# Patient Record
Sex: FEMALE | Race: WHITE | NOT HISPANIC OR LATINO
[De-identification: names, ages, dates, MRNs, and addresses within clinical notes are randomized per-mention and may not be internally consistent; named-entity substitution may affect disease eponyms.]

---

## 2023-03-09 PROBLEM — Z00.00 ENCOUNTER FOR PREVENTIVE HEALTH EXAMINATION: Status: ACTIVE | Noted: 2023-03-09

## 2023-03-14 ENCOUNTER — APPOINTMENT (OUTPATIENT)
Dept: CARDIOLOGY | Facility: CLINIC | Age: 88
End: 2023-03-14
Payer: MEDICARE

## 2023-03-14 ENCOUNTER — LABORATORY RESULT (OUTPATIENT)
Age: 88
End: 2023-03-14

## 2023-03-14 ENCOUNTER — NON-APPOINTMENT (OUTPATIENT)
Age: 88
End: 2023-03-14

## 2023-03-14 VITALS
BODY MASS INDEX: 20.01 KG/M2 | RESPIRATION RATE: 16 BRPM | HEART RATE: 52 BPM | TEMPERATURE: 98 F | HEIGHT: 61 IN | DIASTOLIC BLOOD PRESSURE: 70 MMHG | WEIGHT: 106 LBS | SYSTOLIC BLOOD PRESSURE: 160 MMHG | OXYGEN SATURATION: 93 %

## 2023-03-14 DIAGNOSIS — I51.7 CARDIOMEGALY: ICD-10-CM

## 2023-03-14 PROCEDURE — 93000 ELECTROCARDIOGRAM COMPLETE: CPT

## 2023-03-14 PROCEDURE — 99203 OFFICE O/P NEW LOW 30 MIN: CPT | Mod: 25

## 2023-03-14 NOTE — DISCUSSION/SUMMARY
[FreeTextEntry1] : This is a 89-year-old female with past medical history significant for hypertension, status post hysterectomy, status post appendectomy, scoliosis, status post repair of rectocele, who comes in for cardiac consultation.\par She denies chest pain, shortness of breath, dizziness or syncope.  She has no history of rheumatic fever.  She does not drink excessive caffeine or alcohol.\par Cardiac risk factors include hypertension and history of smoking 1 pack/week for approximately 37 years.\par The patient is taking atenolol daily (she is unsure of the dose) and will call us with the dosage, amlodipine 2.5 mg/day, aspirin 81 mg/day and over-the-counter fish oil.\par Electrocardiogram done March 14, 2023 demonstrated sinus bradycardia rate of 52 bpm is otherwise remarkable for left atrial abnormality, right bundle branch block, and first-degree atrioventricular block.  There are T wave inversion in lead III, aVF, and V1 to V4.\par Echo Doppler examination done March 16, 2021 demonstrated mild aortic valve regurgitation, mild mitral valve regurgitation, moderate tricuspid valve regurgitation, normal left ventricular function with estimated ejection fraction of 60%, left atrial enlargement.\par Her blood pressure is elevated today, and she will increase her amlodipine to 5 mg daily.\par She will new blood work done today for electrolytes, lipid panel SMA-20.\par She is instructed to follow-up with her primary care physician.  She will follow-up with me in 6 to 8 weeks to reevaluate her blood pressure.\par She is currently hemodynamically stable and asymptomatic from a cardiac standpoint.\par \par Pt is a 89 year old female coming in for cardiac consultation. She has a PMH of HTN, scoliosis, s/p hysterectomy, s/p appendectomy, s/p hernia repair, s/p rectocele. She is overall doing well today and denies chest pain, dizziness, palpations, SOB, or leg swelling. She does admit to back pain. She is currently on Atenolol 50mg (in the morning), Amlodipine 2.5mg (at night), Aspirin 81mg, and fish oil. She has allergies to penicillin and has a smoking history of 4 cigs/day for 37 years but stopped 37 years ago. Her mother had history of HTN and angina. Her EKG today shows sinus bradycardia with rate 52 with RBBB with left-axis bifascicular block and left atrial enlargement. \par \par Her transthoracic echo on 3/16/21 showed severe left atrial dilatation (LA volume index 57 ml/m^2), LV EF 60%, right atrium dilational (RA volume index 43 ml/m^2), mild AR, mild MR, moderate TR.

## 2023-03-14 NOTE — REASON FOR VISIT
[CV Risk Factors and Non-Cardiac Disease] : CV risk factors and non-cardiac disease [Arrhythmia/ECG Abnorrmalities] : arrhythmia/ECG abnormalities [Hypertension] : hypertension [FreeTextEntry3] : Dr. Dominguez [FreeTextEntry1] : Pt is a 89 year old female coming in for cardiac consultation. She has a PMH of HTN, scoliosis, s/p hysterectomy, s/p appendectomy, s/p hernia repair, s/p rectocele. She is overall doing well today and denies chest pain, dizziness, palpations, SOB, or leg swelling. She does admit to back pain. She is currently on Atenolol 50mg (in the morning), Amlodipine 2.5mg (at night), Aspirin 81mg, and fish oil. She has allergies to penicillin and has a smoking history of 4 cigs/day for 37 years but stopped 37 years ago. Her mother had history of HTN and angina. Her EKG today shows sinus bradycardia with rate 52 with RBBB with left-axis bifascicular block and left atrial enlargement. \par \par Her transthoracic echo on 3/16/21 showed severe left atrial dilatation (LA volume index 57 ml/m^2), LV EF 60%, right atrium dilational (RA volume index 43 ml/m^2), mild AR, mild MR, moderate TR.\par

## 2023-10-06 ENCOUNTER — RX RENEWAL (OUTPATIENT)
Age: 88
End: 2023-10-06

## 2023-10-08 DIAGNOSIS — Z86.79 PERSONAL HISTORY OF OTHER DISEASES OF THE CIRCULATORY SYSTEM: ICD-10-CM

## 2023-10-09 ENCOUNTER — APPOINTMENT (OUTPATIENT)
Dept: CARDIOLOGY | Facility: CLINIC | Age: 88
End: 2023-10-09

## 2023-10-22 ENCOUNTER — NON-APPOINTMENT (OUTPATIENT)
Age: 88
End: 2023-10-22

## 2023-10-23 ENCOUNTER — EMERGENCY (EMERGENCY)
Facility: HOSPITAL | Age: 88
LOS: 1 days | Discharge: HOME | End: 2023-10-23
Attending: EMERGENCY MEDICINE | Admitting: EMERGENCY MEDICINE
Payer: MEDICARE

## 2023-10-23 VITALS
WEIGHT: 110.01 LBS | HEIGHT: 65 IN | HEART RATE: 62 BPM | RESPIRATION RATE: 17 BRPM | SYSTOLIC BLOOD PRESSURE: 154 MMHG | OXYGEN SATURATION: 98 % | TEMPERATURE: 98 F | DIASTOLIC BLOOD PRESSURE: 74 MMHG

## 2023-10-23 VITALS
RESPIRATION RATE: 15 BRPM | HEART RATE: 65 BPM | TEMPERATURE: 98 F | SYSTOLIC BLOOD PRESSURE: 148 MMHG | OXYGEN SATURATION: 100 % | DIASTOLIC BLOOD PRESSURE: 76 MMHG

## 2023-10-23 PROCEDURE — 90715 TDAP VACCINE 7 YRS/> IM: CPT

## 2023-10-23 PROCEDURE — 72125 CT NECK SPINE W/O DYE: CPT | Mod: 26,MA

## 2023-10-23 PROCEDURE — 72125 CT NECK SPINE W/O DYE: CPT | Mod: MA

## 2023-10-23 PROCEDURE — 70486 CT MAXILLOFACIAL W/O DYE: CPT | Mod: 26,MA

## 2023-10-23 PROCEDURE — 70450 CT HEAD/BRAIN W/O DYE: CPT | Mod: MA

## 2023-10-23 PROCEDURE — 99284 EMERGENCY DEPT VISIT MOD MDM: CPT

## 2023-10-23 PROCEDURE — 99284 EMERGENCY DEPT VISIT MOD MDM: CPT | Mod: 25

## 2023-10-23 PROCEDURE — 90471 IMMUNIZATION ADMIN: CPT

## 2023-10-23 PROCEDURE — 70486 CT MAXILLOFACIAL W/O DYE: CPT | Mod: MA

## 2023-10-23 PROCEDURE — 70450 CT HEAD/BRAIN W/O DYE: CPT | Mod: 26,MA

## 2023-10-23 RX ORDER — TETANUS TOXOID, REDUCED DIPHTHERIA TOXOID AND ACELLULAR PERTUSSIS VACCINE, ADSORBED 5; 2.5; 8; 8; 2.5 [IU]/.5ML; [IU]/.5ML; UG/.5ML; UG/.5ML; UG/.5ML
0.5 SUSPENSION INTRAMUSCULAR ONCE
Refills: 0 | Status: COMPLETED | OUTPATIENT
Start: 2023-10-23 | End: 2023-10-23

## 2023-10-23 RX ORDER — ACETAMINOPHEN 500 MG
650 TABLET ORAL ONCE
Refills: 0 | Status: DISCONTINUED | OUTPATIENT
Start: 2023-10-23 | End: 2023-10-27

## 2023-10-23 RX ADMIN — TETANUS TOXOID, REDUCED DIPHTHERIA TOXOID AND ACELLULAR PERTUSSIS VACCINE, ADSORBED 0.5 MILLILITER(S): 5; 2.5; 8; 8; 2.5 SUSPENSION INTRAMUSCULAR at 14:59

## 2023-10-23 NOTE — ED ADULT NURSE NOTE - NSFALLHARMRISKINTERV_ED_ALL_ED

## 2023-10-23 NOTE — ED PROVIDER NOTE - PHYSICAL EXAMINATION
On exam patient sitting up in bed no acute distress bilateral ecchymosis noted to the superior and inferior orbital regions with no bony deformity.  Pupils equal round and reactive extraocular muscles are intact without pain on range of motion.  There is contusion noted to the nasal bridge no active epistaxis or septal hematoma noted.  Full range of motion of the jaw with mild clicking no loose teeth noted.  No midline C-T-L tenderness to palpation  Full range of motion of bilateral upper and lower extremities with no pain on range of motion neurovascular intact  No chest wall tenderness to palpation  No pelvic tenderness to palpation  There is a small abrasion noted to the left lateral nasal bridge with no active bleeding at this time

## 2023-10-23 NOTE — ED PROVIDER NOTE - DIFFERENTIAL DIAGNOSIS
Patient presenting to the emergency room status post mechanical fall day prior.  Neurovascular intact at this time ambulated in.  Patient noted to have orbital trauma.  No visual changes.  Will obtain CT imaging of the head to exclude intracranial hemorrhage CT maxillofacial to exclude orbital nasal fracture CT imaging of the cervical spine and will monitor.  Refusing pain medication at this time Differential Diagnosis

## 2023-10-23 NOTE — ED PROVIDER NOTE - PATIENT PORTAL LINK FT
You can access the FollowMyHealth Patient Portal offered by [facility] by registering at the following website: [email or web address]. By joining [facility]’s FollowMyHealth portal, you will also be able to view your health information using other applications (apps) compatible with our system.

## 2023-10-23 NOTE — ED ADULT TRIAGE NOTE - CHIEF COMPLAINT QUOTE
Patient BIB son with c/o mechanical fall up one step yesterday around 5pm with positive head strike, -LOC. Patient with ecchymosis to B/L periorbital area and forehead. Patient admits to taking one baby ASA daily. Patient denies N/V/ dizziness, visual changes, endorses HA but states this feels like her normal headache.

## 2023-10-23 NOTE — ED PROVIDER NOTE - OBJECTIVE STATEMENT
Patient is a 89-year-old female who presents to the emergency room with a chief complaint of facial pain status post mechanical fall.  Past medical history of hypertension on Norvasc chronic neck and back pain on naproxen as needed but has not taken this in quite some time.  Patient does endorse she is on a daily aspirin.  She has an unsteady gait at baseline and when out she uses a walker as an assist device.  Yesterday she was walking up the steps tripped on 1 step and fell forward striking her face.  At the time she was wearing glasses and sustained bruising to her orbital region and nose.  Denies loss of consciousness was able to get up on her own and has been ambulatory since with no difficulty.  Currently denies any head pain visual changes nausea vomiting chest pain shortness of breath or abdominal pain.  No neck or back pain at this time.  No extremity numbness or weakness.  No loss of bowel or bladder control.  Ambulatory without issue.  She does note that this morning when she blew her nose there was some mild blood which prompted her visit to the emergency room no active epistaxis at this time.

## 2023-10-23 NOTE — ED ADULT NURSE NOTE - OBJECTIVE STATEMENT
Pt received in bed alert and oriented and resting in bed with the c/o mechanical fall yesterday when going up the stairs. Pt hit head/face on stairs. Pt presented in ED B/L periorbital area and forehead bruising/ecchymosis. Pt denies any dizziness, or nausea and vomiting. As per Md's orders meds given and tolerated well.
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
## 2023-10-23 NOTE — ED PROVIDER NOTE - NSFOLLOWUPINSTRUCTIONS_ED_ALL_ED_FT
Return to the ED for any new or worsening symptoms  Take your medication as prescribed  Doxycycline 1 tab twice a day please complete the antibiotic prescription  Follow-up with the plastic surgeon as needed  Advance activity as tolerated        Head Injury    WHAT YOU NEED TO KNOW:    A head injury can include your scalp, face, skull, or brain and range from mild to severe. Effects can appear immediately after the injury or develop later. The effects may last a short time or be permanent. Healthcare providers may want to check your recovery over time. Treatment may change as you recover or develop new health problems from the head injury.    DISCHARGE INSTRUCTIONS:    Call your local emergency number (911 in the ), or have someone else call if:    You cannot be woken.    You have a seizure.    You stop responding to others or you faint.    You have blurry or double vision.    Your speech becomes slurred or confused.    You have arm or leg weakness, loss of feeling, or new problems with coordination.    Your pupils are larger than usual, or one pupil is a different size than the other.    You have blood or clear fluid coming out of your ears or nose.  Seek care immediately if:    You have repeated or forceful vomiting.    You feel confused.    Your headache gets worse or becomes severe.    You or someone caring for you notices that you are harder to wake than usual.  Call your doctor if:    Your symptoms last longer than 6 weeks after the injury.    You have questions or concerns about your condition or care.  Medicines:    Acetaminophen decreases pain and fever. It is available without a doctor's order. Ask how much to take and how often to take it. Follow directions. Read the labels of all other medicines you are using to see if they also contain acetaminophen, or ask your doctor or pharmacist. Acetaminophen can cause liver damage if not taken correctly.    Take your medicine as directed. Contact your healthcare provider if you think your medicine is not helping or if you have side effects. Tell your provider if you are allergic to any medicine. Keep a list of the medicines, vitamins, and herbs you take. Include the amounts, and when and why you take them. Bring the list or the pill bottles to follow-up visits. Carry your medicine list with you in case of an emergency.  Self-care:    Rest or do quiet activities. Limit your time watching TV, using the computer, or doing tasks that require a lot of thinking. Slowly return to your normal activities as directed. Do not play sports or do activities that may cause you to get hit in the head. Ask your healthcare provider when you can return to sports.    Apply ice on your head for 15 to 20 minutes every hour or as directed. Use an ice pack, or put crushed ice in a plastic bag. Cover it with a towel before you apply it to your skin. Ice helps prevent tissue damage and decreases swelling and pain.    Have someone stay with you for 24 hours , or as directed. This person can monitor you for problems and call for help if needed. When you are awake, the person should ask you a few questions every few hours to see if you are thinking clearly. An example is to ask your name or address.  Prevent another head injury:    Wear a helmet that fits properly. Do this when you play sports, or ride a bike, scooter, or skateboard. Helmets help decrease your risk for a serious head injury. Talk to your healthcare provider about other ways you can protect yourself if you play sports.    Wear your seatbelt every time you are in a car. This helps lower your risk for a head injury if you are in a car accident.  Follow up with your doctor as directed: Write down your questions so you remember to ask them during your visits.

## 2023-10-23 NOTE — ED PROVIDER NOTE - CARE PROVIDER_API CALL
Tanner Yusuf  Plastic Surgery  [address]  [place] [address]  Phone: [phone]  Fax: [phone]  Follow Up Time:

## 2024-03-28 ENCOUNTER — RX RENEWAL (OUTPATIENT)
Age: 89
End: 2024-03-28

## 2024-04-15 ENCOUNTER — APPOINTMENT (OUTPATIENT)
Dept: CARDIOLOGY | Facility: CLINIC | Age: 89
End: 2024-04-15
Payer: MEDICARE

## 2024-04-15 VITALS
OXYGEN SATURATION: 97 % | BODY MASS INDEX: 19.63 KG/M2 | SYSTOLIC BLOOD PRESSURE: 126 MMHG | WEIGHT: 104 LBS | TEMPERATURE: 98.1 F | HEART RATE: 66 BPM | HEIGHT: 61 IN | RESPIRATION RATE: 16 BRPM | DIASTOLIC BLOOD PRESSURE: 75 MMHG

## 2024-04-15 DIAGNOSIS — I07.1 RHEUMATIC TRICUSPID INSUFFICIENCY: ICD-10-CM

## 2024-04-15 DIAGNOSIS — Z86.79 PERSONAL HISTORY OF OTHER DISEASES OF THE CIRCULATORY SYSTEM: ICD-10-CM

## 2024-04-15 PROCEDURE — G2211 COMPLEX E/M VISIT ADD ON: CPT

## 2024-04-15 PROCEDURE — 99214 OFFICE O/P EST MOD 30 MIN: CPT

## 2024-04-15 PROCEDURE — 93000 ELECTROCARDIOGRAM COMPLETE: CPT

## 2024-04-15 RX ORDER — AMLODIPINE BESYLATE 5 MG/1
5 TABLET ORAL
Qty: 90 | Refills: 1 | Status: ACTIVE | COMMUNITY
Start: 2023-03-14 | End: 1900-01-01

## 2024-04-15 NOTE — DISCUSSION/SUMMARY
[FreeTextEntry1] : Dr. Osorio-(PRIOR VISIT and PMH WITH Dr. Osorio): This is a 89-year-old female with past medical history significant for hypertension, status post hysterectomy, status post appendectomy, scoliosis, status post repair of rectocele, who comes in for cardiac consultation. She denies chest pain, shortness of breath, dizziness or syncope.  She has no history of rheumatic fever.  She does not drink excessive caffeine or alcohol. Cardiac risk factors include hypertension and history of smoking 1 pack/week for approximately 37 years. The patient is taking atenolol daily (she is unsure of the dose) and will call us with the dosage, amlodipine 2.5 mg/day, aspirin 81 mg/day and over-the-counter fish oil. Electrocardiogram done March 14, 2023 demonstrated sinus bradycardia rate of 52 bpm is otherwise remarkable for left atrial abnormality, right bundle branch block, and first-degree atrioventricular block.  There are T wave inversion in lead III, aVF, and V1 to V4. Echo Doppler examination done March 16, 2021 demonstrated mild aortic valve regurgitation, mild mitral valve regurgitation, moderate tricuspid valve regurgitation, normal left ventricular function with estimated ejection fraction of 60%, left atrial enlargement. Her blood pressure is elevated today, and she will increase her amlodipine to 5 mg daily. She will new blood work done today for electrolytes, lipid panel SMA-20. She is instructed to follow-up with her primary care physician.  She will follow-up with me in 6 to 8 weeks to reevaluate her blood pressure. She is currently hemodynamically stable and asymptomatic from a cardiac standpoint.  Pt is a 89 year old female coming in for cardiac consultation. She has a PMH of HTN, scoliosis, s/p hysterectomy, s/p appendectomy, s/p hernia repair, s/p rectocele. She is overall doing well today and denies chest pain, dizziness, palpations, SOB, or leg swelling. She does admit to back pain. She is currently on Atenolol 50mg (in the morning), Amlodipine 2.5mg (at night), Aspirin 81mg, and fish oil. She has allergies to penicillin and has a smoking history of 4 cigs/day for 37 years but stopped 37 years ago. Her mother had history of HTN and angina. Her EKG today shows sinus bradycardia with rate 52 with RBBB with left-axis bifascicular block and left atrial enlargement.   Her transthoracic echo on 3/16/21 showed severe left atrial dilatation (LA volume index 57 ml/m^2), LV EF 60%, right atrium dilational (RA volume index 43 ml/m^2), mild AR, mild MR, moderate TR.

## 2024-04-15 NOTE — ASSESSMENT
[FreeTextEntry1] : This is a 89-year-old female with past medical history significant for hypertension, status post hysterectomy, status post appendectomy, scoliosis, status post repair of rectocele, who comes in for cardiac follow-up evaluation.  She has no history of rheumatic fever.  She does not drink excessive caffeine or alcohol.  Cardiac risk factors include hypertension and history of smoking 1 pack/week for approximately 37 years.   HPI: She is feeling generally well today and denies chest pain, dizziness, heart palpitations, recent episodes of syncope or falls, SOB, or dyspnea at this time.  Current Medications: Atenolol 25 mg daily, Amlodipine 5 mg daily, Aspirin 81 mg daily, and Fish Oil.    BLOOD PRESSURE: Controlled.   BLOOD WORK: -Lipid panel done by PCP April 2024 demonstrated cholesterol 148, triglycerides 96, HDL 41, LDL 88.  -Lipid panel done March 2023 demonstrated triglycerides 65, cholesterol 172, HDL 42, , Non-, LDL direct 118.    TESTING/REPORTS: -EKG done 04/15/2024 demonstrated sinus bradycardia rate 52 BPM otherwise remarkable for right bundle branch block, left atrial abnormality, and left axis deviation. There are T wave inversions in lead III, aVF, V1-V4.  -Electrocardiogram done March 14, 2023 demonstrated sinus bradycardia rate of 52 bpm is otherwise remarkable for left atrial abnormality, right bundle branch block, and first-degree atrioventricular block.  There are T wave inversion in lead III, aVF, and V1 to V4.   PLAN: -She will continue her current medications and contact the office if problems arise before next follow-up appointment. -Patient will schedule echocardiogram doppler examination to evaluate murmur, left ventricular function, chamber size, and rule out hypertrophy. -She will follow-up with her PCP routinely.    I have discussed the plan of care with CHRISTINE HAILE and she will follow up in 3 months. They are compliant with all of their medications.     The patient understands that aerobic exercises must be increased to 40 minutes 4 times/week and a detailed discussion of lifestyle modification was done today.   The patient has a good understanding of the diagnosis, treatment plan and lifestyle modification.   They will contact me at the office for any questions with their care or any changes in their health status.   The patient was discussed with supervision physician Dr. Dk Osorio at the time of the visit and the plan of care will be carried out as noted above.     SUMANTH Phelps NP

## 2024-05-20 ENCOUNTER — NON-APPOINTMENT (OUTPATIENT)
Age: 89
End: 2024-05-20

## 2024-06-17 ENCOUNTER — LABORATORY RESULT (OUTPATIENT)
Age: 89
End: 2024-06-17

## 2024-06-17 ENCOUNTER — NON-APPOINTMENT (OUTPATIENT)
Age: 89
End: 2024-06-17

## 2024-06-17 ENCOUNTER — APPOINTMENT (OUTPATIENT)
Dept: CARDIOLOGY | Facility: CLINIC | Age: 89
End: 2024-06-17
Payer: MEDICARE

## 2024-06-17 VITALS
HEIGHT: 61 IN | HEART RATE: 50 BPM | WEIGHT: 106 LBS | OXYGEN SATURATION: 97 % | RESPIRATION RATE: 16 BRPM | TEMPERATURE: 98 F | DIASTOLIC BLOOD PRESSURE: 93 MMHG | BODY MASS INDEX: 20.01 KG/M2 | SYSTOLIC BLOOD PRESSURE: 129 MMHG

## 2024-06-17 DIAGNOSIS — I45.10 UNSPECIFIED RIGHT BUNDLE-BRANCH BLOCK: ICD-10-CM

## 2024-06-17 DIAGNOSIS — I34.0 NONRHEUMATIC MITRAL (VALVE) INSUFFICIENCY: ICD-10-CM

## 2024-06-17 DIAGNOSIS — I10 ESSENTIAL (PRIMARY) HYPERTENSION: ICD-10-CM

## 2024-06-17 DIAGNOSIS — I35.1 NONRHEUMATIC AORTIC (VALVE) INSUFFICIENCY: ICD-10-CM

## 2024-06-17 DIAGNOSIS — I65.29 OCCLUSION AND STENOSIS OF UNSPECIFIED CAROTID ARTERY: ICD-10-CM

## 2024-06-17 DIAGNOSIS — I65.22 OCCLUSION AND STENOSIS OF LEFT CAROTID ARTERY: ICD-10-CM

## 2024-06-17 PROCEDURE — 99214 OFFICE O/P EST MOD 30 MIN: CPT

## 2024-06-17 PROCEDURE — G2211 COMPLEX E/M VISIT ADD ON: CPT

## 2024-06-17 PROCEDURE — 93000 ELECTROCARDIOGRAM COMPLETE: CPT

## 2024-06-17 RX ORDER — ROSUVASTATIN CALCIUM 5 MG/1
5 TABLET, FILM COATED ORAL DAILY
Qty: 90 | Refills: 1 | Status: ACTIVE | COMMUNITY
Start: 2024-06-17 | End: 1900-01-01

## 2024-06-17 NOTE — REASON FOR VISIT
[CV Risk Factors and Non-Cardiac Disease] : CV risk factors and non-cardiac disease [Arrhythmia/ECG Abnorrmalities] : arrhythmia/ECG abnormalities [Hypertension] : hypertension [FreeTextEntry3] : Dr. Dominguez [FreeTextEntry1] : Pt is a 89 year old female coming in for cardiac consultation. She has a PMH of HTN, scoliosis, s/p hysterectomy, s/p appendectomy, s/p hernia repair, s/p rectocele. She is overall doing well today and denies chest pain, dizziness, palpations, SOB, or leg swelling. She does admit to back pain. She is currently on Atenolol 50mg (in the morning), Amlodipine 2.5mg (at night), Aspirin 81mg, and fish oil. She has allergies to penicillin and has a smoking history of 4 cigs/day for 37 years but stopped 37 years ago. Her mother had history of HTN and angina. Her EKG today shows sinus bradycardia with rate 52 with RBBB with left-axis bifascicular block and left atrial enlargement.   Her transthoracic echo on 3/16/21 showed severe left atrial dilatation (LA volume index 57 ml/m^2), LV EF 60%, right atrium dilational (RA volume index 43 ml/m^2), mild AR, mild MR, moderate TR.

## 2024-06-17 NOTE — DISCUSSION/SUMMARY
[FreeTextEntry1] : This is a 89-year-old female with past medical history significant for hypertension, status post hysterectomy, status post appendectomy, scoliosis, status post repair of rectocele, who comes in for cardiac follow-up evaluation. She denies chest pain, shortness of breath, dizziness or syncope.  She has no history of rheumatic fever.  She does not drink excessive caffeine or alcohol. Cardiac risk factors include hypertension and history of smoking 1 pack/week for approximately 37 years. Electrocardiogram done June 17, 2024 demonstrates sinus bradycardia rate of 50 bpm is otherwise remarkable for right bundle branch block, left atrial abnormality, and nonspecific ST-T wave changes. The patient is taking atenolol daily (she is unsure of the dose) and will call us with the dosage, amlodipine 2.5 mg/day, aspirin 81 mg/day and over-the-counter fish oil. The patient was seen by her neurologist who ordered a carotid MRA May 2, 2024 which demonstrated moderate stenosis of the origin of the left internal carotid artery felt to be 50 to 69% which was more pronounced on this examination than the previous study there was otherwise normal flow in the right coronary artery was unremarkable for stenosis. I recommended she make an appoint with Dr. Melgar of vascular surgery for evaluation and surveillance. I recommend the patient's start on Crestor 5 mg daily to lower LDL cholesterol less than 70 mg/dL. Lipid panel done April 2, 2024 demonstrated cholesterol 148, HDL of 41, LDL calculated 88 mg/dL, triglycerides 96 mg/dL Electrocardiogram done March 14, 2023 demonstrated sinus bradycardia rate of 52 bpm is otherwise remarkable for left atrial abnormality, right bundle branch block, and first-degree atrioventricular block.  There are T wave inversion in lead III, aVF, and V1 to V4. Echo Doppler examination done March 16, 2021 demonstrated mild aortic valve regurgitation, mild mitral valve regurgitation, moderate tricuspid valve regurgitation, normal left ventricular function with estimated ejection fraction of 60%, left atrial enlargement. She is instructed to follow-up with her primary care physician.  She will follow-up with me in 6 to 8 weeks to reevaluate her blood pressure. She is currently hemodynamically stable and asymptomatic from a cardiac standpoint. She will follow-up blood work done as noted before.  Further recommendation will made at that time.

## 2024-06-17 NOTE — HISTORY OF PRESENT ILLNESS
[FreeTextEntry1] : 89 year old female with history of hypertension and carotid plaque, and occipital neuralgia with an MRA done May 2, 2024 demonstrated moderate stenosis (50-69%) origin of the left internal carotid artery. Patient referred to vascular surgeon for evaluation. Patient will start on Crestor 5 mg daily for cardiovascular risk reduction and follow up with blood work in 6 weeks. Blood pressure under good control.

## 2024-06-17 NOTE — ASSESSMENT
[FreeTextEntry1] : prior note NP Mattie 4/15/2024;   This is a 89-year-old female with past medical history significant for hypertension, status post hysterectomy, status post appendectomy, scoliosis, status post repair of rectocele, who comes in for cardiac follow-up evaluation.  She has no history of rheumatic fever.  She does not drink excessive caffeine or alcohol.  Cardiac risk factors include hypertension and history of smoking 1 pack/week for approximately 37 years.   HPI: She is feeling generally well today and denies chest pain, dizziness, heart palpitations, recent episodes of syncope or falls, SOB, or dyspnea at this time.  Current Medications: Atenolol 25 mg daily, Amlodipine 5 mg daily, Aspirin 81 mg daily, and Fish Oil.    BLOOD PRESSURE: Controlled.   BLOOD WORK: -Lipid panel done by PCP April 2024 demonstrated cholesterol 148, triglycerides 96, HDL 41, LDL 88.  -Lipid panel done March 2023 demonstrated triglycerides 65, cholesterol 172, HDL 42, , Non-, LDL direct 118.    TESTING/REPORTS: -EKG done 04/15/2024 demonstrated sinus bradycardia rate 52 BPM otherwise remarkable for right bundle branch block, left atrial abnormality, and left axis deviation. There are T wave inversions in lead III, aVF, V1-V4.  -Electrocardiogram done March 14, 2023 demonstrated sinus bradycardia rate of 52 bpm is otherwise remarkable for left atrial abnormality, right bundle branch block, and first-degree atrioventricular block.  There are T wave inversion in lead III, aVF, and V1 to V4.   PLAN: -She will continue her current medications and contact the office if problems arise before next follow-up appointment. -Patient will schedule echocardiogram doppler examination to evaluate murmur, left ventricular function, chamber size, and rule out hypertrophy. -She will follow-up with her PCP routinely.    I have discussed the plan of care with CHRISTINE MARTINEZBAMBI and she will follow up in 3 months. They are compliant with all of their medications.     The patient understands that aerobic exercises must be increased to 40 minutes 4 times/week and a detailed discussion of lifestyle modification was done today.   The patient has a good understanding of the diagnosis, treatment plan and lifestyle modification.   They will contact me at the office for any questions with their care or any changes in their health status.   The patient was discussed with supervision physician Dr. Dk Osorio at the time of the visit and the plan of care will be carried out as noted above.     SUMANTH Phelps NP

## 2024-06-19 ENCOUNTER — TRANSCRIPTION ENCOUNTER (OUTPATIENT)
Age: 89
End: 2024-06-19

## 2024-06-19 RX ORDER — ATENOLOL 25 MG/1
25 TABLET ORAL DAILY
Refills: 0 | Status: DISCONTINUED | COMMUNITY
End: 2024-06-19

## 2024-06-24 ENCOUNTER — TRANSCRIPTION ENCOUNTER (OUTPATIENT)
Age: 89
End: 2024-06-24

## 2024-06-26 ENCOUNTER — NON-APPOINTMENT (OUTPATIENT)
Age: 89
End: 2024-06-26

## 2024-07-10 ENCOUNTER — NON-APPOINTMENT (OUTPATIENT)
Age: 89
End: 2024-07-10

## 2024-07-10 ENCOUNTER — APPOINTMENT (OUTPATIENT)
Dept: VASCULAR SURGERY | Facility: CLINIC | Age: 89
End: 2024-07-10
Payer: MEDICARE

## 2024-07-10 VITALS — TEMPERATURE: 97.9 F | SYSTOLIC BLOOD PRESSURE: 175 MMHG | DIASTOLIC BLOOD PRESSURE: 72 MMHG | HEART RATE: 54 BPM

## 2024-07-10 PROCEDURE — 99203 OFFICE O/P NEW LOW 30 MIN: CPT

## 2024-07-10 PROCEDURE — 93880 EXTRACRANIAL BILAT STUDY: CPT

## 2024-07-10 RX ORDER — PHENYLEPHRINE HCL 10 MG
TABLET ORAL
Refills: 0 | Status: ACTIVE | COMMUNITY

## 2024-07-10 RX ORDER — NAPROXEN 500 MG/1
500 TABLET, DELAYED RELEASE ORAL
Refills: 0 | Status: ACTIVE | COMMUNITY

## 2024-07-10 RX ORDER — MULTIVIT-MIN/IRON/FOLIC ACID/K 18-600-40
500 CAPSULE ORAL
Refills: 0 | Status: ACTIVE | COMMUNITY

## 2024-07-10 RX ORDER — PHENOL 1.4 %
600 AEROSOL, SPRAY (ML) MUCOUS MEMBRANE
Refills: 0 | Status: ACTIVE | COMMUNITY

## 2024-07-10 RX ORDER — ATENOLOL 50 MG/1
50 TABLET ORAL
Refills: 0 | Status: ACTIVE | COMMUNITY

## 2024-09-05 ENCOUNTER — APPOINTMENT (OUTPATIENT)
Dept: CARDIOLOGY | Facility: CLINIC | Age: 89
End: 2024-09-05

## 2024-09-05 VITALS
RESPIRATION RATE: 16 BRPM | WEIGHT: 101 LBS | BODY MASS INDEX: 19.07 KG/M2 | OXYGEN SATURATION: 98 % | HEIGHT: 61 IN | DIASTOLIC BLOOD PRESSURE: 82 MMHG | HEART RATE: 73 BPM | TEMPERATURE: 97.9 F | SYSTOLIC BLOOD PRESSURE: 130 MMHG

## 2024-09-05 DIAGNOSIS — I65.29 OCCLUSION AND STENOSIS OF UNSPECIFIED CAROTID ARTERY: ICD-10-CM

## 2024-09-05 DIAGNOSIS — I34.0 NONRHEUMATIC MITRAL (VALVE) INSUFFICIENCY: ICD-10-CM

## 2024-09-05 DIAGNOSIS — I10 ESSENTIAL (PRIMARY) HYPERTENSION: ICD-10-CM

## 2024-09-05 DIAGNOSIS — I45.10 UNSPECIFIED RIGHT BUNDLE-BRANCH BLOCK: ICD-10-CM

## 2024-09-05 DIAGNOSIS — I35.1 NONRHEUMATIC AORTIC (VALVE) INSUFFICIENCY: ICD-10-CM

## 2024-09-05 PROCEDURE — 93306 TTE W/DOPPLER COMPLETE: CPT

## 2024-09-05 PROCEDURE — 99214 OFFICE O/P EST MOD 30 MIN: CPT | Mod: 25

## 2024-09-05 NOTE — REASON FOR VISIT
[CV Risk Factors and Non-Cardiac Disease] : CV risk factors and non-cardiac disease [Arrhythmia/ECG Abnorrmalities] : arrhythmia/ECG abnormalities [Hypertension] : hypertension [FreeTextEntry3] : Dr. Dominguez [FreeTextEntry1] : Pt is a 89 year old female coming in for cardiac follow-up evaluation. She has a PMH of HTN, scoliosis, s/p hysterectomy, s/p appendectomy, s/p hernia repair, s/p rectocele. She is overall doing well today and denies chest pain, dizziness, palpations, SOB, or leg swelling. She does admit to back pain. She is currently on Atenolol 50mg (in the morning), Amlodipine 2.5mg (at night), Aspirin 81mg, and fish oil. She has allergies to penicillin and has a smoking history of 4 cigs/day for 37 years but stopped 37 years ago. Her mother had history of HTN and angina.  Her transthoracic echo on 3/16/21 showed severe left atrial dilatation (LA volume index 57 ml/m^2), LV EF 60%, right atrium dilational (RA volume index 43 ml/m^2), mild AR, mild MR, moderate TR.

## 2024-09-05 NOTE — DISCUSSION/SUMMARY
[FreeTextEntry1] : This is a 89-year-old female with past medical history significant for hypertension, status post hysterectomy, status post appendectomy, scoliosis, status post repair of rectocele, who comes in for cardiac follow-up evaluation. She denies chest pain, shortness of breath, dizziness or syncope.  She has no history of rheumatic fever.  She does not drink excessive caffeine or alcohol. Cardiac risk factors include hypertension and history of smoking 1 pack/week for approximately 37 years. Lipid panel done August 19, 2024 demonstrated a cholesterol of 101, HDL of 38, triglycerides of 46, LDL calculated 51, non-HDL cholesterol 63, LDL direct 60 mg/dL with hemoglobin A1c of 5.6. The patient will continue on her current dose of Crestor 5 mg daily. She was seen by Dr. Melgar of vascular surgery in July 2024 who felt that she does not have surgical carotid artery disease. She is encouraged to maintain good hydration.  She will remain on Crestor 5 mg daily. Once again I have asked her to contact the office with the dosage of her atenolol therapy.  Electrocardiogram done June 17, 2024 demonstrates sinus bradycardia rate of 50 bpm is otherwise remarkable for right bundle branch block, left atrial abnormality, and nonspecific ST-T wave changes. The patient is taking atenolol daily (she is unsure of the dose) and will call us with the dosage, amlodipine 2.5 mg/day, aspirin 81 mg/day and over-the-counter fish oil. The patient was seen by her neurologist who ordered a carotid MRA May 2, 2024 which demonstrated moderate stenosis of the origin of the left internal carotid artery felt to be 50 to 69% which was more pronounced on this examination than the previous study there was otherwise normal flow in the right coronary artery was unremarkable for stenosis. I recommend the patient's start on Crestor 5 mg daily to lower LDL cholesterol less than 70 mg/dL. Lipid panel done April 2, 2024 demonstrated cholesterol 148, HDL of 41, LDL calculated 88 mg/dL, triglycerides 96 mg/dL Electrocardiogram done March 14, 2023 demonstrated sinus bradycardia rate of 52 bpm is otherwise remarkable for left atrial abnormality, right bundle branch block, and first-degree atrioventricular block.  There are T wave inversion in lead III, aVF, and V1 to V4. Echo Doppler examination done March 16, 2021 demonstrated mild aortic valve regurgitation, mild mitral valve regurgitation, moderate tricuspid valve regurgitation, normal left ventricular function with estimated ejection fraction of 60%, left atrial enlargement. She is instructed to follow-up with her primary care physician.  She will follow-up with me in 6 to 8 weeks to reevaluate her blood pressure. She is currently hemodynamically stable and asymptomatic from a cardiac standpoint. She will follow-up blood work done as noted before.  Further recommendation will made at that time.

## 2024-09-12 ENCOUNTER — RX RENEWAL (OUTPATIENT)
Age: 89
End: 2024-09-12

## 2024-09-27 RX ORDER — MULTIVIT-MIN/FA/LYCOPEN/LUTEIN .4-300-25
TABLET ORAL
Refills: 0 | Status: ACTIVE | COMMUNITY
Start: 2024-09-27

## 2024-11-07 DIAGNOSIS — I07.1 RHEUMATIC TRICUSPID INSUFFICIENCY: ICD-10-CM

## 2024-11-07 DIAGNOSIS — I34.0 NONRHEUMATIC MITRAL (VALVE) INSUFFICIENCY: ICD-10-CM

## 2024-11-07 DIAGNOSIS — I35.1 NONRHEUMATIC AORTIC (VALVE) INSUFFICIENCY: ICD-10-CM

## 2025-03-18 LAB
ALBUMIN SERPL ELPH-MCNC: 4.3 G/DL
ALP BLD-CCNC: 60 U/L
ALT SERPL-CCNC: 17 U/L
ANION GAP SERPL CALC-SCNC: 10 MMOL/L
AST SERPL-CCNC: 22 U/L
BILIRUB DIRECT SERPL-MCNC: 0.3 MG/DL
BILIRUB INDIRECT SERPL-MCNC: 1.1 MG/DL
BILIRUB SERPL-MCNC: 1.4 MG/DL
BUN SERPL-MCNC: 16 MG/DL
CALCIUM SERPL-MCNC: 9.7 MG/DL
CHLORIDE SERPL-SCNC: 104 MMOL/L
CHOLEST SERPL-MCNC: 113 MG/DL
CO2 SERPL-SCNC: 30 MMOL/L
CREAT SERPL-MCNC: 0.61 MG/DL
EGFRCR SERPLBLD CKD-EPI 2021: 84 ML/MIN/1.73M2
ESTIMATED AVERAGE GLUCOSE: 114 MG/DL
GLUCOSE SERPL-MCNC: 83 MG/DL
HBA1C MFR BLD HPLC: 5.6 %
HDLC SERPL-MCNC: 36 MG/DL
LDLC SERPL CALC-MCNC: 64 MG/DL
LDLC SERPL DIRECT ASSAY-MCNC: 66 MG/DL
MAGNESIUM SERPL-MCNC: 2 MG/DL
NONHDLC SERPL-MCNC: 77 MG/DL
POTASSIUM SERPL-SCNC: 4.5 MMOL/L
PROT SERPL-MCNC: 6.7 G/DL
SODIUM SERPL-SCNC: 143 MMOL/L
TRIGL SERPL-MCNC: 57 MG/DL
TSH SERPL-ACNC: 3.26 UIU/ML
URATE SERPL-MCNC: 3.8 MG/DL

## 2025-03-26 ENCOUNTER — NON-APPOINTMENT (OUTPATIENT)
Age: 89
End: 2025-03-26

## 2025-03-26 ENCOUNTER — APPOINTMENT (OUTPATIENT)
Dept: CARDIOLOGY | Facility: CLINIC | Age: 89
End: 2025-03-26
Payer: MEDICARE

## 2025-03-26 VITALS
OXYGEN SATURATION: 99 % | HEIGHT: 61 IN | BODY MASS INDEX: 19.83 KG/M2 | SYSTOLIC BLOOD PRESSURE: 127 MMHG | DIASTOLIC BLOOD PRESSURE: 80 MMHG | RESPIRATION RATE: 16 BRPM | TEMPERATURE: 97.6 F | WEIGHT: 105 LBS | HEART RATE: 53 BPM

## 2025-03-26 DIAGNOSIS — I35.1 NONRHEUMATIC AORTIC (VALVE) INSUFFICIENCY: ICD-10-CM

## 2025-03-26 DIAGNOSIS — I34.0 NONRHEUMATIC MITRAL (VALVE) INSUFFICIENCY: ICD-10-CM

## 2025-03-26 DIAGNOSIS — I45.10 UNSPECIFIED RIGHT BUNDLE-BRANCH BLOCK: ICD-10-CM

## 2025-03-26 DIAGNOSIS — I10 ESSENTIAL (PRIMARY) HYPERTENSION: ICD-10-CM

## 2025-03-26 PROCEDURE — G2211 COMPLEX E/M VISIT ADD ON: CPT

## 2025-03-26 PROCEDURE — 93308 TTE F-UP OR LMTD: CPT

## 2025-03-26 PROCEDURE — 99214 OFFICE O/P EST MOD 30 MIN: CPT

## 2025-03-26 PROCEDURE — 93000 ELECTROCARDIOGRAM COMPLETE: CPT

## 2025-06-30 ENCOUNTER — APPOINTMENT (OUTPATIENT)
Dept: VASCULAR SURGERY | Facility: CLINIC | Age: 89
End: 2025-06-30
Payer: MEDICARE

## 2025-06-30 PROCEDURE — 93880 EXTRACRANIAL BILAT STUDY: CPT

## 2025-07-01 ENCOUNTER — NON-APPOINTMENT (OUTPATIENT)
Age: 89
End: 2025-07-01